# Patient Record
Sex: FEMALE | Race: WHITE | Employment: FULL TIME | URBAN - NONMETROPOLITAN AREA
[De-identification: names, ages, dates, MRNs, and addresses within clinical notes are randomized per-mention and may not be internally consistent; named-entity substitution may affect disease eponyms.]

---

## 2023-08-21 ENCOUNTER — TELEPHONE (OUTPATIENT)
Dept: HEMATOLOGY | Age: 58
End: 2023-08-21

## 2023-08-21 DIAGNOSIS — D58.2 ELEVATED HEMOGLOBIN (HCC): Primary | ICD-10-CM

## 2023-08-21 RX ORDER — CYCLOBENZAPRINE HCL 10 MG
10 TABLET ORAL NIGHTLY PRN
COMMUNITY

## 2023-08-21 RX ORDER — ATORVASTATIN CALCIUM 20 MG/1
20 TABLET, FILM COATED ORAL DAILY
COMMUNITY
End: 2023-08-22

## 2023-08-21 RX ORDER — LISINOPRIL 10 MG/1
10 TABLET ORAL DAILY
COMMUNITY

## 2023-08-21 NOTE — PROGRESS NOTES
OP HEMATOLOGY/ONCOLOGY CONSULTATION      Pt Name: Devonte Mayo  YOB: 1965  MRN: [id number]  Referring provider: Annie Shahid   PCP: Miah Larsen      Date of evaluation: 8/22/2023   History Obtained From:  patient, electronic medical record    CHIEF COMPLAINT:    Chief Complaint   Patient presents with    New Patient     Dx:elevated hgb      HISTORY OF PRESENT ILLNESS:    Devonte Mayo is a 60 y.o.  female referred from Dr. Donato Wheeler for evaluation of elevated hemoglobin. Serology 7/14/2023  CBC- HGB 16.2, HCT 48.3, MCV 92    Martha denied any nicotine dependence, COPD etc.  However she does have sleep apnea and had difficulty tolerating her CPAP therefore she does not use it. She does not have any overt headaches at this time, chest pain, visual disturbances. She reports that for several years her hemoglobin has been elevated at times. She denies any family history of blood dyscrasias. She does have essential hypertension and is on lisinopril 10 daily. She does take fish oil 200 mg daily. She takes vitamin D daily. She does take estrogen progesterone replacement. She does have rheumatoid arthritis was previously treated in [place] now under the care of Dr. Donato Wheeler.         Past Medical History:   Diagnosis Date    Arthritis     Hypertension        Past Surgical History:   Procedure Laterality Date    ENDOMETRIAL ABLATION      uterine ablation in 2011    SINUS SURGERY      interverted papillama removal in 2021    URETHRAL SURGERY      urethra lengthning 1968 and 1984         Current Outpatient Medications:     modafinil (PROVIGIL) 200 MG tablet, , Disp: , Rfl:     hyoscyamine (LEVSIN/SL) 125 MCG sublingual tablet, , Disp: , Rfl:     estrogens, conjugated,-methylTESTOSTERone (ESTRATEST HS) 0.625-1.25 MG per tablet, , Disp: , Rfl:     vitamin D (CHOLECALCIFEROL) 125 MCG (5000 UT) CAPS capsule, Take 1 capsule by mouth, Disp: , Rfl:     Omega-3 Fatty

## 2023-08-22 ENCOUNTER — OFFICE VISIT (OUTPATIENT)
Dept: HEMATOLOGY | Age: 58
End: 2023-08-22
Payer: COMMERCIAL

## 2023-08-22 ENCOUNTER — HOSPITAL ENCOUNTER (OUTPATIENT)
Dept: INFUSION THERAPY | Age: 58
Discharge: HOME | End: 2023-08-22
Payer: COMMERCIAL

## 2023-08-22 VITALS
BODY MASS INDEX: 28.36 KG/M2 | HEART RATE: 76 BPM | WEIGHT: 166.1 LBS | OXYGEN SATURATION: 98 % | DIASTOLIC BLOOD PRESSURE: 80 MMHG | HEIGHT: 64 IN | SYSTOLIC BLOOD PRESSURE: 120 MMHG

## 2023-08-22 DIAGNOSIS — G47.33 OBSTRUCTIVE SLEEP APNEA: ICD-10-CM

## 2023-08-22 DIAGNOSIS — D58.2 ELEVATED HEMOGLOBIN (HCC): ICD-10-CM

## 2023-08-22 DIAGNOSIS — D58.2 ELEVATED HEMOGLOBIN (HCC): Primary | ICD-10-CM

## 2023-08-22 LAB
ERYTHROCYTE [DISTWIDTH] IN BLOOD BY AUTOMATED COUNT: 13 % (ref 11.7–14.4)
FERRITIN SERPL-MCNC: 298.8 NG/ML (ref 13–150)
HCT VFR BLD AUTO: 43.6 % (ref 34.1–44.9)
HGB BLD-MCNC: 15.2 G/DL (ref 11.2–15.7)
IRON SATN MFR SERPL: 33 % (ref 14–50)
IRON SERPL-MCNC: 85 UG/DL (ref 37–145)
LYMPHOCYTES # BLD: 2.32 K/UL (ref 1.18–3.74)
LYMPHOCYTES NFR BLD: 43.8 % (ref 19.3–53.1)
MCH RBC QN AUTO: 30.7 PG (ref 25.6–32.2)
MCHC RBC AUTO-ENTMCNC: 34.9 G/DL (ref 32.3–35.5)
MCV RBC AUTO: 88.1 FL (ref 79.4–94.8)
MONOCYTES # BLD: 0.63 K/UL (ref 0.24–0.82)
MONOCYTES NFR BLD: 11.9 % (ref 4.7–12.5)
NEUTROPHILS # BLD: 2.14 K/UL (ref 1.56–6.13)
NEUTS SEG NFR BLD: 40.4 % (ref 34–71.1)
PLATELET # BLD AUTO: 161 K/UL (ref 182–369)
PMV BLD AUTO: 10.7 FL (ref 7.4–10.4)
RBC # BLD AUTO: 4.95 M/UL (ref 3.93–5.22)
TIBC SERPL-MCNC: 261 UG/DL (ref 250–400)
WBC # BLD AUTO: 5.3 K/UL (ref 3.98–10.04)

## 2023-08-22 PROCEDURE — 99203 OFFICE O/P NEW LOW 30 MIN: CPT | Performed by: PHYSICIAN ASSISTANT

## 2023-08-22 PROCEDURE — 85025 COMPLETE CBC W/AUTO DIFF WBC: CPT

## 2023-08-22 PROCEDURE — 99202 OFFICE O/P NEW SF 15 MIN: CPT

## 2023-08-22 PROCEDURE — 36415 COLL VENOUS BLD VENIPUNCTURE: CPT

## 2023-08-22 RX ORDER — MAGNESIUM OXIDE/MAG AA CHELATE 300 MG
CAPSULE ORAL
COMMUNITY

## 2023-08-22 RX ORDER — ESTERIFIED ESTROGEN AND METHYLTESTOSTERONE .625; 1.25 MG/1; MG/1
TABLET ORAL
COMMUNITY

## 2023-08-22 RX ORDER — MODAFINIL 200 MG/1
TABLET ORAL
COMMUNITY
Start: 2023-08-11

## 2023-08-22 RX ORDER — CHLORAL HYDRATE 500 MG
200 CAPSULE ORAL DAILY
COMMUNITY

## 2023-08-22 ASSESSMENT — ENCOUNTER SYMPTOMS
BLOOD IN STOOL: 0
NAUSEA: 0
VOICE CHANGE: 1
PHOTOPHOBIA: 0
SHORTNESS OF BREATH: 1
COLOR CHANGE: 0
ABDOMINAL DISTENTION: 0
EYE DISCHARGE: 0
COUGH: 0
SORE THROAT: 0
ABDOMINAL PAIN: 1
WHEEZING: 0
TROUBLE SWALLOWING: 0
DIARRHEA: 0
BACK PAIN: 1
EYE ITCHING: 0
VOMITING: 0
CONSTIPATION: 0

## 2023-08-22 ASSESSMENT — PROMIS GLOBAL HEALTH SCALE
IN GENERAL, HOW WOULD YOU RATE YOUR MENTAL HEALTH, INCLUDING YOUR MOOD AND YOUR ABILITY TO THINK [ON A SCALE OF 1 (POOR) TO 5 (EXCELLENT)]?: 2
IN GENERAL, WOULD YOU SAY YOUR QUALITY OF LIFE IS...[ON A SCALE OF 1 (POOR) TO 5 (EXCELLENT)]: 2
SUM OF RESPONSES TO QUESTIONS 2, 4, 5, & 10: 10
IN THE PAST 7 DAYS, HOW WOULD YOU RATE YOUR PAIN ON AVERAGE [ON A SCALE FROM 0 (NO PAIN) TO 10 (WORST IMAGINABLE PAIN)]?: 8
SUM OF RESPONSES TO QUESTIONS 3, 6, 7, & 8: 14
IN THE PAST 7 DAYS, HOW OFTEN HAVE YOU BEEN BOTHERED BY EMOTIONAL PROBLEMS, SUCH AS FEELING ANXIOUS, DEPRESSED, OR IRRITABLE [ON A SCALE FROM 1 (NEVER) TO 5 (ALWAYS)]?: 1
IN GENERAL, PLEASE RATE HOW WELL YOU CARRY OUT YOUR USUAL SOCIAL ACTIVITIES (INCLUDES ACTIVITIES AT HOME, AT WORK, AND IN YOUR COMMUNITY, AND RESPONSIBILITIES AS A PARENT, CHILD, SPOUSE, EMPLOYEE, FRIEND, ETC) [ON A SCALE OF 1 (POOR) TO 5 (EXCELLENT)]?: 5
IN GENERAL, WOULD YOU SAY YOUR HEALTH IS...[ON A SCALE OF 1 (POOR) TO 5 (EXCELLENT)]: 3
IN GENERAL, HOW WOULD YOU RATE YOUR PHYSICAL HEALTH [ON A SCALE OF 1 (POOR) TO 5 (EXCELLENT)]?: 2
IN GENERAL, HOW WOULD YOU RATE YOUR SATISFACTION WITH YOUR SOCIAL ACTIVITIES AND RELATIONSHIPS [ON A SCALE OF 1 (POOR) TO 5 (EXCELLENT)]?: 5
IN THE PAST 7 DAYS, HOW WOULD YOU RATE YOUR FATIGUE ON AVERAGE [ON A SCALE FROM 1 (NONE) TO 5 (VERY SEVERE)]?: 1
TO WHAT EXTENT ARE YOU ABLE TO CARRY OUT YOUR EVERYDAY PHYSICAL ACTIVITIES SUCH AS WALKING, CLIMBING STAIRS, CARRYING GROCERIES, OR MOVING A CHAIR [ON A SCALE OF 1 (NOT AT ALL) TO 5 (COMPLETELY)]?: 3

## 2023-08-24 ENCOUNTER — TELEPHONE (OUTPATIENT)
Dept: HEMATOLOGY | Age: 58
End: 2023-08-24

## 2023-09-13 ENCOUNTER — TELEPHONE (OUTPATIENT)
Dept: HEMATOLOGY | Age: 58
End: 2023-09-13

## 2023-09-13 NOTE — TELEPHONE ENCOUNTER
JM Polanco called patient to follow up from [facility] screening. Patient did not answer this call. SW left message encouraging the patient to return this call at their conveneince.

## 2023-11-27 ENCOUNTER — TELEPHONE (OUTPATIENT)
Dept: HEMATOLOGY | Age: 58
End: 2023-11-27

## 2023-11-27 NOTE — TELEPHONE ENCOUNTER
Called patient and reminded patient of their appointment on 11/28/23 and patient confirmed they would be here.

## 2023-11-28 ENCOUNTER — HOSPITAL ENCOUNTER (OUTPATIENT)
Dept: INFUSION THERAPY | Age: 58
Discharge: HOME | End: 2023-11-28
Payer: COMMERCIAL

## 2023-11-28 ENCOUNTER — OFFICE VISIT (OUTPATIENT)
Dept: HEMATOLOGY | Age: 58
End: 2023-11-28
Payer: COMMERCIAL

## 2023-11-28 VITALS
BODY MASS INDEX: 27.79 KG/M2 | DIASTOLIC BLOOD PRESSURE: 72 MMHG | WEIGHT: 162.8 LBS | SYSTOLIC BLOOD PRESSURE: 122 MMHG | HEIGHT: 64 IN | OXYGEN SATURATION: 98 % | HEART RATE: 65 BPM

## 2023-11-28 DIAGNOSIS — D58.2 ELEVATED HEMOGLOBIN (HCC): Primary | ICD-10-CM

## 2023-11-28 DIAGNOSIS — R79.89 ELEVATED FERRITIN: ICD-10-CM

## 2023-11-28 DIAGNOSIS — D58.2 ELEVATED HEMOGLOBIN (HCC): ICD-10-CM

## 2023-11-28 DIAGNOSIS — G47.33 OBSTRUCTIVE SLEEP APNEA: ICD-10-CM

## 2023-11-28 LAB
BASOPHILS # BLD: 0.06 K/UL (ref 0.01–0.08)
BASOPHILS NFR BLD: 0.8 % (ref 0.1–1.2)
EOSINOPHIL # BLD: 0.09 K/UL (ref 0.04–0.54)
EOSINOPHIL NFR BLD: 1.1 % (ref 0.7–7)
ERYTHROCYTE [DISTWIDTH] IN BLOOD BY AUTOMATED COUNT: 12 % (ref 11.7–14.4)
FERRITIN SERPL-MCNC: 160.1 NG/ML (ref 13–150)
HCT VFR BLD AUTO: 46.7 % (ref 34.1–44.9)
HGB BLD-MCNC: 15.9 G/DL (ref 11.2–15.7)
LYMPHOCYTES # BLD: 3.05 K/UL (ref 1.18–3.74)
LYMPHOCYTES NFR BLD: 38.1 % (ref 19.3–53.1)
MCH RBC QN AUTO: 30.3 PG (ref 25.6–32.2)
MCHC RBC AUTO-ENTMCNC: 34 G/DL (ref 32.3–35.5)
MCV RBC AUTO: 89.1 FL (ref 79.4–94.8)
MONOCYTES # BLD: 0.54 K/UL (ref 0.24–0.82)
MONOCYTES NFR BLD: 6.8 % (ref 4.7–12.5)
NEUTROPHILS # BLD: 4.24 K/UL (ref 1.56–6.13)
NEUTS SEG NFR BLD: 52.9 % (ref 34–71.1)
PLATELET # BLD AUTO: 271 K/UL (ref 182–369)
PMV BLD AUTO: 10.1 FL (ref 7.4–10.4)
RBC # BLD AUTO: 5.24 M/UL (ref 3.93–5.22)
WBC # BLD AUTO: 8 K/UL (ref 3.98–10.04)

## 2023-11-28 PROCEDURE — 36415 COLL VENOUS BLD VENIPUNCTURE: CPT

## 2023-11-28 PROCEDURE — 99212 OFFICE O/P EST SF 10 MIN: CPT

## 2023-11-28 PROCEDURE — 85025 COMPLETE CBC W/AUTO DIFF WBC: CPT

## 2023-11-28 PROCEDURE — 99213 OFFICE O/P EST LOW 20 MIN: CPT | Performed by: PHYSICIAN ASSISTANT

## 2023-11-28 ASSESSMENT — ENCOUNTER SYMPTOMS
SORE THROAT: 0
VOMITING: 0
TROUBLE SWALLOWING: 0
ABDOMINAL PAIN: 0
EYE ITCHING: 0
BACK PAIN: 1
ABDOMINAL DISTENTION: 0
PHOTOPHOBIA: 0
COLOR CHANGE: 0
COUGH: 0
NAUSEA: 0
BLOOD IN STOOL: 0
WHEEZING: 0
DIARRHEA: 0
SHORTNESS OF BREATH: 1
EYE DISCHARGE: 0
VOICE CHANGE: 1
CONSTIPATION: 0

## 2023-11-28 NOTE — PROGRESS NOTES
Progress Note      Pt Name: Artur Preston  YOB: 1965  MRN: [id number]    Date of evaluation: 11/28/2023  History Obtained From:  patient, electronic medical record    CHIEF COMPLAINT:    Chief Complaint   Patient presents with    Follow-up     Elevated hemoglobin      Current active problems  Erythrocytosis  Elevated ferritin  Sleep apnea    HISTORY OF PRESENT ILLNESS:    Artur Preston is a 60 y.o.  female who was initially seen in the office on 8/22/2023 for mild erythrocytosis. She does have significant sleep apnea but has not been able to tolerate her CPAP. Her machine is actually in [place]-she is going through a divorce. I did have iron panel with ferritin obtained on her initial visit, erythropoietin level. She continues to have moderately severe fatigue as well as arthralgias. She previously carried a diagnosis of rheumatoid arthritis but Dr. Hector Pacheco has evaluated her and does not feel that she has rheumatoid arthritis or lupus. She does have essential hypertension and is on lisinopril 10 daily. She does take fish oil 200 mg daily. She takes vitamin D daily. She takes hormone replacement that does have estrogen and methyltestosterone. HEMATOLOGY HISTORY:  Audrey Goodell was seen in initial hematology consultation on 8/22/2023 referred from Dr. Hector Pacheco for evaluation of elevated hemoglobin. Serology 7/14/2023  CBC- HGB 16.2, HCT 48.3, MCV 92     Martha denied any nicotine dependence, COPD etc.  However she does have sleep apnea and had difficulty tolerating her CPAP therefore she does not use it. She does not have any overt headaches at this time, chest pain, visual disturbances. She reports that for several years her hemoglobin has been elevated at times. She denies any family history of blood dyscrasias.      Her initial CBC in the office on 8/22/2023 revealed a WBC of 5.3 with 40.4% PMN, 43.8% lymphocyte, 11.9% monocyte, Hgb 15.2, HCT 43.6, PLT

## 2024-01-26 ENCOUNTER — TELEPHONE (OUTPATIENT)
Dept: HEMATOLOGY | Age: 59
End: 2024-01-26

## 2024-01-26 NOTE — TELEPHONE ENCOUNTER
Called patient and reminded patient of their appointment on 1/30/2024 and patient confirmed they would be here.

## 2024-01-29 NOTE — PROGRESS NOTES
Progress Note      Pt Name: Martha Garcia  YOB: 1965  MRN: [id number]    Date of evaluation: 1/30/2024  History Obtained From:  patient, electronic medical record    CHIEF COMPLAINT:    Chief Complaint   Patient presents with    Follow-up     Elevated hemoglobin (HCC)         Current active problems  Erythrocytosis  Elevated ferritin  Sleep apnea    HISTORY OF PRESENT ILLNESS:    Martha Garcia is a 58 y.o.  female who was initially seen in the office on 8/22/2023 for mild erythrocytosis.    She does have significant sleep apnea but has not been able to tolerate her CPAP.  Her machine is actually in [place]-she is going through a divorce.    She previously carried a diagnosis of rheumatoid arthritis but Dr. Aime Carmichael has evaluated her and does not feel that she has rheumatoid arthritis or lupus.  She has been doing intermittent fasting 18-6 and she reports that the inflammation that she felt in her joints is much better.  Energy wise she has been doing much better as well.    Prior ferritin was elevated but it repeat last visit and showed improvement there is minimal elevation.  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      She does have essential hypertension and is on lisinopril 10 daily.  She does take fish oil 200 mg daily.  She takes vitamin D daily.  She takes hormone replacement that does have estrogen and methyltestosterone.    HEMATOLOGY HISTORY: Mild erythrocytosis  Martha was seen in initial hematology consultation on 8/22/2023 referred from Dr. Aime Carmichael for evaluation of elevated hemoglobin.      Serology 7/14/2023  CBC- HGB 16.2, HCT 48.3, MCV 92     Martha denied any nicotine dependence, COPD etc.  However she does have sleep apnea and had difficulty tolerating her CPAP therefore she does not use it.     She does not have any overt headaches at

## 2024-01-30 ENCOUNTER — OFFICE VISIT (OUTPATIENT)
Dept: HEMATOLOGY | Age: 59
End: 2024-01-30
Payer: COMMERCIAL

## 2024-01-30 ENCOUNTER — HOSPITAL ENCOUNTER (OUTPATIENT)
Dept: INFUSION THERAPY | Age: 59
Discharge: HOME | End: 2024-01-30
Payer: COMMERCIAL

## 2024-01-30 VITALS
TEMPERATURE: 98 F | OXYGEN SATURATION: 98 % | HEART RATE: 77 BPM | BODY MASS INDEX: 27.39 KG/M2 | HEIGHT: 64 IN | SYSTOLIC BLOOD PRESSURE: 130 MMHG | WEIGHT: 160.4 LBS | DIASTOLIC BLOOD PRESSURE: 72 MMHG

## 2024-01-30 DIAGNOSIS — R79.89 ELEVATED FERRITIN: ICD-10-CM

## 2024-01-30 DIAGNOSIS — G47.33 OBSTRUCTIVE SLEEP APNEA: ICD-10-CM

## 2024-01-30 DIAGNOSIS — D58.2 ELEVATED HEMOGLOBIN (HCC): ICD-10-CM

## 2024-01-30 DIAGNOSIS — Z87.891 PERSONAL HISTORY OF TOBACCO USE: ICD-10-CM

## 2024-01-30 DIAGNOSIS — D58.2 ELEVATED HEMOGLOBIN (HCC): Primary | ICD-10-CM

## 2024-01-30 DIAGNOSIS — Z87.891 HISTORY OF NICOTINE DEPENDENCE: ICD-10-CM

## 2024-01-30 DIAGNOSIS — Z53.20 LUNG CANCER SCREENING DECLINED BY PATIENT: ICD-10-CM

## 2024-01-30 LAB
BASOPHILS # BLD: 0.09 K/UL (ref 0.01–0.08)
BASOPHILS NFR BLD: 1 % (ref 0.1–1.2)
EOSINOPHIL # BLD: 0.09 K/UL (ref 0.04–0.54)
EOSINOPHIL NFR BLD: 1 % (ref 0.7–7)
ERYTHROCYTE [DISTWIDTH] IN BLOOD BY AUTOMATED COUNT: 12.4 % (ref 11.7–14.4)
FERRITIN SERPL-MCNC: 130.5 NG/ML (ref 13–150)
HCT VFR BLD AUTO: 46.3 % (ref 34.1–44.9)
HGB BLD-MCNC: 15.7 G/DL (ref 11.2–15.7)
IRON SATN MFR SERPL: 29 % (ref 14–50)
IRON SERPL-MCNC: 90 UG/DL (ref 37–145)
LYMPHOCYTES # BLD: 2.92 K/UL (ref 1.18–3.74)
LYMPHOCYTES NFR BLD: 33 % (ref 19.3–53.1)
MCH RBC QN AUTO: 30.5 PG (ref 25.6–32.2)
MCHC RBC AUTO-ENTMCNC: 33.9 G/DL (ref 32.3–35.5)
MCV RBC AUTO: 90.1 FL (ref 79.4–94.8)
MONOCYTES # BLD: 0.68 K/UL (ref 0.24–0.82)
MONOCYTES NFR BLD: 7.7 % (ref 4.7–12.5)
NEUTROPHILS # BLD: 5.06 K/UL (ref 1.56–6.13)
NEUTS SEG NFR BLD: 57.1 % (ref 34–71.1)
PLATELET # BLD AUTO: 286 K/UL (ref 182–369)
PMV BLD AUTO: 10.2 FL (ref 7.4–10.4)
RBC # BLD AUTO: 5.14 M/UL (ref 3.93–5.22)
TIBC SERPL-MCNC: 315 UG/DL (ref 250–400)
WBC # BLD AUTO: 8.86 K/UL (ref 3.98–10.04)

## 2024-01-30 PROCEDURE — 99212 OFFICE O/P EST SF 10 MIN: CPT

## 2024-01-30 PROCEDURE — 99213 OFFICE O/P EST LOW 20 MIN: CPT | Performed by: PHYSICIAN ASSISTANT

## 2024-01-30 PROCEDURE — 85025 COMPLETE CBC W/AUTO DIFF WBC: CPT

## 2024-01-30 PROCEDURE — 36415 COLL VENOUS BLD VENIPUNCTURE: CPT

## 2024-01-30 PROCEDURE — G0296 VISIT TO DETERM LDCT ELIG: HCPCS | Performed by: PHYSICIAN ASSISTANT

## 2024-01-30 ASSESSMENT — ENCOUNTER SYMPTOMS
TROUBLE SWALLOWING: 0
ABDOMINAL PAIN: 0
NAUSEA: 0
DIARRHEA: 0
BLOOD IN STOOL: 0
PHOTOPHOBIA: 0
VOICE CHANGE: 1
SORE THROAT: 0
COLOR CHANGE: 0
EYE DISCHARGE: 0
EYE ITCHING: 0
SHORTNESS OF BREATH: 0
VOMITING: 0
BACK PAIN: 1
ABDOMINAL DISTENTION: 0
COUGH: 0
CONSTIPATION: 0
WHEEZING: 0

## 2024-01-31 ENCOUNTER — TELEPHONE (OUTPATIENT)
Dept: INFUSION THERAPY | Age: 59
End: 2024-01-31

## 2024-01-31 NOTE — TELEPHONE ENCOUNTER
----- Message from John Escobar PA-C sent at 1/30/2024  4:32 PM CST -----  Let her know that her ferritin and iron levels were actually completely normal this time

## 2024-02-07 LAB
CALR EXON 9 MUT ANL BLD/T: NORMAL
CITATION REF LAB TEST: NORMAL
JAK2 GENE MUT ANL BLD/T: NORMAL
JAK2 P.V617F BLD/T QL: NORMAL
LAB DIRECTOR NAME PROVIDER: NORMAL
MPL GENE MUT TESTED MAR: NORMAL
REF LAB TEST METHOD: NORMAL
REFLEX: NORMAL
TEST PERFORMANCE INFO SPEC: NORMAL

## 2024-04-25 ENCOUNTER — TELEPHONE (OUTPATIENT)
Dept: HEMATOLOGY | Age: 59
End: 2024-04-25

## 2024-04-29 NOTE — PROGRESS NOTES
Progress Note      Pt Name: Martha Garcia  YOB: 1965  MRN: [id number]    Date of evaluation: 4/30/2024  History Obtained From:  patient, electronic medical record    CHIEF COMPLAINT:    Chief Complaint   Patient presents with    Follow-up     Elevated hemoglobin     Current active problems  Erythrocytosis  Elevated ferritin  Sleep apnea    HISTORY OF PRESENT ILLNESS:    Martha Garcia is a 58 y.o.  female who was initially seen in the office on 8/22/2023 for mild erythrocytosis.    She does have significant sleep apnea but has not been able to tolerate her CPAP.  Her machine is actually in [place]-she is going through a divorce.  She continues to have significant anxiety.    She previously carried a diagnosis of rheumatoid arthritis but Dr. Aime Carmichael has evaluated her and does not feel that she has rheumatoid arthritis or lupus.  She felt much better when she was in January.  She feels rundown today.  She is still having issues with swelling of her hands and feet.    She previously had elevated ferritin but when rechecked in January it was completely normal at 130.5.  She has not had any significant elevation of her iron saturation.    She does have essential hypertension and is on lisinopril 10 daily.  She does take fish oil 200 mg daily.  She takes vitamin D daily.  She takes hormone replacement that does have estrogen and methyltestosterone.    HEMATOLOGY HISTORY: Mild erythrocytosis  Martha was seen in initial hematology consultation on 8/22/2023 referred from Dr. Aime Carmichael for evaluation of elevated hemoglobin.      Serology 7/14/2023  CBC- HGB 16.2, HCT 48.3, MCV 92     Martha denied any nicotine dependence, COPD etc.  However she does have sleep apnea and had difficulty tolerating her CPAP therefore she does not use it.     She does not have any overt headaches at this time, chest pain, visual disturbances.  She reports that for several years her hemoglobin has been

## 2024-04-30 ENCOUNTER — HOSPITAL ENCOUNTER (OUTPATIENT)
Dept: INFUSION THERAPY | Age: 59
Discharge: HOME | End: 2024-04-30
Payer: COMMERCIAL

## 2024-04-30 ENCOUNTER — OFFICE VISIT (OUTPATIENT)
Dept: HEMATOLOGY | Age: 59
End: 2024-04-30
Payer: COMMERCIAL

## 2024-04-30 VITALS
WEIGHT: 160 LBS | TEMPERATURE: 98.2 F | SYSTOLIC BLOOD PRESSURE: 116 MMHG | HEART RATE: 72 BPM | HEIGHT: 64 IN | DIASTOLIC BLOOD PRESSURE: 80 MMHG | BODY MASS INDEX: 27.31 KG/M2 | OXYGEN SATURATION: 93 %

## 2024-04-30 DIAGNOSIS — G47.33 OBSTRUCTIVE SLEEP APNEA: ICD-10-CM

## 2024-04-30 DIAGNOSIS — Z87.891 HISTORY OF NICOTINE DEPENDENCE: ICD-10-CM

## 2024-04-30 DIAGNOSIS — D58.2 ELEVATED HEMOGLOBIN (HCC): Primary | ICD-10-CM

## 2024-04-30 DIAGNOSIS — R79.89 ELEVATED FERRITIN: ICD-10-CM

## 2024-04-30 DIAGNOSIS — D58.2 ELEVATED HEMOGLOBIN (HCC): ICD-10-CM

## 2024-04-30 LAB
BASOPHILS # BLD: 0.08 K/UL (ref 0.01–0.08)
BASOPHILS NFR BLD: 1.2 % (ref 0.1–1.2)
EOSINOPHIL # BLD: 0.09 K/UL (ref 0.04–0.54)
EOSINOPHIL NFR BLD: 1.4 % (ref 0.7–7)
ERYTHROCYTE [DISTWIDTH] IN BLOOD BY AUTOMATED COUNT: 12.1 % (ref 11.7–14.4)
FERRITIN SERPL-MCNC: 152.9 NG/ML (ref 13–150)
HCT VFR BLD AUTO: 45.2 % (ref 34.1–44.9)
HGB BLD-MCNC: 15.6 G/DL (ref 11.2–15.7)
IRON SATN MFR SERPL: 41 % (ref 14–50)
IRON SERPL-MCNC: 119 UG/DL (ref 37–145)
LYMPHOCYTES # BLD: 2.56 K/UL (ref 1.18–3.74)
LYMPHOCYTES NFR BLD: 39.9 % (ref 19.3–53.1)
MCH RBC QN AUTO: 30.8 PG (ref 25.6–32.2)
MCHC RBC AUTO-ENTMCNC: 34.5 G/DL (ref 32.3–35.5)
MCV RBC AUTO: 89.2 FL (ref 79.4–94.8)
MONOCYTES # BLD: 0.5 K/UL (ref 0.24–0.82)
MONOCYTES NFR BLD: 7.8 % (ref 4.7–12.5)
NEUTROPHILS # BLD: 3.17 K/UL (ref 1.56–6.13)
NEUTS SEG NFR BLD: 49.5 % (ref 34–71.1)
PLATELET # BLD AUTO: 234 K/UL (ref 182–369)
PMV BLD AUTO: 10 FL (ref 7.4–10.4)
RBC # BLD AUTO: 5.07 M/UL (ref 3.93–5.22)
TIBC SERPL-MCNC: 288 UG/DL (ref 250–400)
WBC # BLD AUTO: 6.41 K/UL (ref 3.98–10.04)

## 2024-04-30 PROCEDURE — 36415 COLL VENOUS BLD VENIPUNCTURE: CPT

## 2024-04-30 PROCEDURE — 99212 OFFICE O/P EST SF 10 MIN: CPT

## 2024-04-30 PROCEDURE — 85025 COMPLETE CBC W/AUTO DIFF WBC: CPT

## 2024-04-30 PROCEDURE — 99213 OFFICE O/P EST LOW 20 MIN: CPT | Performed by: PHYSICIAN ASSISTANT

## 2024-04-30 ASSESSMENT — ENCOUNTER SYMPTOMS
SHORTNESS OF BREATH: 0
SORE THROAT: 0
VOICE CHANGE: 1
COUGH: 0
VOMITING: 0
NAUSEA: 0
EYE DISCHARGE: 0
PHOTOPHOBIA: 0
ABDOMINAL DISTENTION: 0
TROUBLE SWALLOWING: 0
CONSTIPATION: 0
BACK PAIN: 1
BLOOD IN STOOL: 0
ABDOMINAL PAIN: 0
WHEEZING: 0
COLOR CHANGE: 0
EYE ITCHING: 0
DIARRHEA: 0

## 2024-05-08 LAB
HFE GENE MUT ANL BLD/T: NORMAL
HFE P.C282Y BLD/T QL: NORMAL
HFE P.H63D BLD/T QL: NEGATIVE
HFE P.S65C BLD/T QL: NORMAL
SPECIMEN SOURCE: NORMAL

## 2024-05-09 ENCOUNTER — TELEPHONE (OUTPATIENT)
Dept: HEMATOLOGY | Age: 59
End: 2024-05-09

## 2024-05-09 DIAGNOSIS — E83.118 OTHER HEMOCHROMATOSIS: Primary | ICD-10-CM

## 2024-05-09 RX ORDER — 0.9 % SODIUM CHLORIDE 0.9 %
500 INTRAVENOUS SOLUTION INTRAVENOUS ONCE
OUTPATIENT
Start: 2024-05-09 | End: 2024-05-09

## 2024-05-09 NOTE — TELEPHONE ENCOUNTER
----- Message from John Escobar PA-C sent at 5/8/2024  3:16 PM CDT -----  Let her know that her hemochromatosis panel was actually positive.  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  Since she has a single copy of 2 different genes she needs to be treated.  We should start therapeutic phlebotomy see if there is a way she can have these done locally or if she wants to come to the office.  If she wants to come to [place] then see if she can have come someone come with her for her first therapeutic phlebotomy-500 cc

## 2024-05-13 ENCOUNTER — CLINICAL DOCUMENTATION (OUTPATIENT)
Facility: HOSPITAL | Age: 59
End: 2024-05-13

## 2024-05-13 ENCOUNTER — HOSPITAL ENCOUNTER (OUTPATIENT)
Dept: INFUSION THERAPY | Age: 59
Setting detail: INFUSION SERIES
Discharge: HOME | End: 2024-05-13
Payer: COMMERCIAL

## 2024-05-13 VITALS
TEMPERATURE: 97.6 F | HEART RATE: 75 BPM | RESPIRATION RATE: 18 BRPM | DIASTOLIC BLOOD PRESSURE: 71 MMHG | SYSTOLIC BLOOD PRESSURE: 116 MMHG | OXYGEN SATURATION: 99 %

## 2024-05-13 DIAGNOSIS — E83.118 OTHER HEMOCHROMATOSIS: Primary | ICD-10-CM

## 2024-05-13 LAB
BASOPHILS # BLD: 0.1 K/UL (ref 0–0.2)
BASOPHILS NFR BLD: 1.5 % (ref 0–1)
EOSINOPHIL # BLD: 0.1 K/UL (ref 0–0.6)
EOSINOPHIL NFR BLD: 1.9 % (ref 0–5)
ERYTHROCYTE [DISTWIDTH] IN BLOOD BY AUTOMATED COUNT: 12.3 % (ref 11.5–14.5)
FERRITIN SERPL-MCNC: 118.1 NG/ML (ref 13–150)
HCT VFR BLD AUTO: 46.5 % (ref 37–47)
HGB BLD-MCNC: 15.4 G/DL (ref 12–16)
IMM GRANULOCYTES # BLD: 0 K/UL
LYMPHOCYTES # BLD: 2.7 K/UL (ref 1.1–4.5)
LYMPHOCYTES NFR BLD: 43.5 % (ref 20–40)
MCH RBC QN AUTO: 30.1 PG (ref 27–31)
MCHC RBC AUTO-ENTMCNC: 33.1 G/DL (ref 33–37)
MCV RBC AUTO: 90.8 FL (ref 81–99)
MONOCYTES # BLD: 0.5 K/UL (ref 0–0.9)
MONOCYTES NFR BLD: 7.8 % (ref 0–10)
NEUTROPHILS # BLD: 2.8 K/UL (ref 1.5–7.5)
NEUTS SEG NFR BLD: 45 % (ref 50–65)
PLATELET # BLD AUTO: 260 K/UL (ref 130–400)
PMV BLD AUTO: 10.5 FL (ref 9.4–12.3)
RBC # BLD AUTO: 5.12 M/UL (ref 4.2–5.4)
WBC # BLD AUTO: 6.2 K/UL (ref 4.8–10.8)

## 2024-05-13 PROCEDURE — 99195 PHLEBOTOMY: CPT

## 2024-05-13 PROCEDURE — 82728 ASSAY OF FERRITIN: CPT

## 2024-05-13 PROCEDURE — 85025 COMPLETE CBC W/AUTO DIFF WBC: CPT

## 2024-05-13 RX ORDER — 0.9 % SODIUM CHLORIDE 0.9 %
500 INTRAVENOUS SOLUTION INTRAVENOUS ONCE
OUTPATIENT
Start: 2024-05-19 | End: 2024-05-19

## 2024-05-13 NOTE — PROGRESS NOTES
Pt arrived to [facility] for therapeutic phlebotomy. 533ml whole blood removed. Pt tolerated well.    Electronically signed by Leticia Cueto RN on 5/13/2024 at 11:52 AM

## 2024-06-13 ENCOUNTER — HOSPITAL ENCOUNTER (OUTPATIENT)
Dept: INFUSION THERAPY | Age: 59
Setting detail: INFUSION SERIES
Discharge: HOME | End: 2024-06-13
Payer: COMMERCIAL

## 2024-06-13 VITALS
SYSTOLIC BLOOD PRESSURE: 122 MMHG | HEART RATE: 64 BPM | RESPIRATION RATE: 18 BRPM | TEMPERATURE: 97.4 F | OXYGEN SATURATION: 96 % | DIASTOLIC BLOOD PRESSURE: 65 MMHG

## 2024-06-13 DIAGNOSIS — E83.118 OTHER HEMOCHROMATOSIS: ICD-10-CM

## 2024-06-13 LAB
BASOPHILS # BLD: 0.1 K/UL (ref 0–0.2)
BASOPHILS NFR BLD: 1.5 % (ref 0–1)
EOSINOPHIL # BLD: 0.1 K/UL (ref 0–0.6)
EOSINOPHIL NFR BLD: 1.4 % (ref 0–5)
ERYTHROCYTE [DISTWIDTH] IN BLOOD BY AUTOMATED COUNT: 12 % (ref 11.5–14.5)
FERRITIN SERPL-MCNC: 89.3 NG/ML (ref 13–150)
HCT VFR BLD AUTO: 46.8 % (ref 37–47)
HGB BLD-MCNC: 15.7 G/DL (ref 12–16)
IMM GRANULOCYTES # BLD: 0 K/UL
LYMPHOCYTES # BLD: 3 K/UL (ref 1.1–4.5)
LYMPHOCYTES NFR BLD: 45 % (ref 20–40)
MCH RBC QN AUTO: 30.5 PG (ref 27–31)
MCHC RBC AUTO-ENTMCNC: 33.5 G/DL (ref 33–37)
MCV RBC AUTO: 90.9 FL (ref 81–99)
MONOCYTES # BLD: 0.5 K/UL (ref 0–0.9)
MONOCYTES NFR BLD: 8.2 % (ref 0–10)
NEUTROPHILS # BLD: 2.9 K/UL (ref 1.5–7.5)
NEUTS SEG NFR BLD: 43.7 % (ref 50–65)
PLATELET # BLD AUTO: 272 K/UL (ref 130–400)
PMV BLD AUTO: 10.1 FL (ref 9.4–12.3)
RBC # BLD AUTO: 5.15 M/UL (ref 4.2–5.4)
WBC # BLD AUTO: 6.6 K/UL (ref 4.8–10.8)

## 2024-06-13 PROCEDURE — 82728 ASSAY OF FERRITIN: CPT

## 2024-06-13 PROCEDURE — 85025 COMPLETE CBC W/AUTO DIFF WBC: CPT

## 2024-06-13 PROCEDURE — 99195 PHLEBOTOMY: CPT

## 2024-06-13 NOTE — DISCHARGE INSTRUCTIONS
Complete Blood Count (CBC): About This Test  What is it?     A complete blood count (CBC) is a blood test that gives important information about your blood cells, especially red blood cells, white blood cells, and platelets.  Why is this test done?  A CBC may be done as part of a regular physical exam. There are many other reasons that a doctor may want this blood test, including to:  Find the cause of symptoms such as fatigue, weakness, fever, bruising, or weight loss.  Check for anemia.  See how much blood has been lost if there is bleeding.  Diagnose polycythemia.  Check for an infection.  Diagnose diseases of the blood, such as leukemia.  Check how the body is dealing with some types of drug or radiation treatment.  Check how abnormal bleeding is affecting the blood cells and counts.  Screen for high and low values before a surgery.  See if there are too many or too few of certain types of cells. This may help find other conditions. For instance, too many eosinophils may be a sign of an allergy or asthma.  A blood count can give valuable information about the general state of your health.  How do you prepare for the test?  In general, there's nothing you have to do before this test, unless your doctor tells you to.  How is the test done?  A health professional uses a needle to take a blood sample, usually from the arm.  What happens after the test?  You will probably be able to go home right away.  You can go back to your usual activities right away.  Follow-up care is a key part of your treatment and safety. Be sure to make and go to all appointments, and call your doctor if you are having problems. It's also a good idea to keep a list of the medicines you take. Ask your doctor when you can expect to have your test results.  Where can you learn more?  Go to https://www.healthwise.net/patientEd and enter Q692 to learn more about \"Complete Blood Count (CBC): About This Test.\"  Current as of: May 13,

## 2024-06-13 NOTE — PROGRESS NOTES
Therapeutic phlebotomy completed. STAT cbc and ferritin obtained at start of phlebotomy. Hbg 15.7   Pt tolerated well.

## 2024-07-12 ENCOUNTER — HOSPITAL ENCOUNTER (OUTPATIENT)
Dept: INFUSION THERAPY | Age: 59
Setting detail: INFUSION SERIES
Discharge: HOME | End: 2024-07-12
Payer: COMMERCIAL

## 2024-07-12 VITALS
OXYGEN SATURATION: 98 % | HEART RATE: 65 BPM | RESPIRATION RATE: 18 BRPM | SYSTOLIC BLOOD PRESSURE: 110 MMHG | TEMPERATURE: 99 F | DIASTOLIC BLOOD PRESSURE: 72 MMHG

## 2024-07-12 DIAGNOSIS — E83.118 OTHER HEMOCHROMATOSIS: ICD-10-CM

## 2024-07-12 LAB
BASOPHILS # BLD: 0.1 K/UL (ref 0–0.2)
BASOPHILS NFR BLD: 1.6 % (ref 0–1)
EOSINOPHIL # BLD: 0.1 K/UL (ref 0–0.6)
EOSINOPHIL NFR BLD: 1.6 % (ref 0–5)
ERYTHROCYTE [DISTWIDTH] IN BLOOD BY AUTOMATED COUNT: 12.2 % (ref 11.5–14.5)
FERRITIN SERPL-MCNC: 42.8 NG/ML (ref 13–150)
HCT VFR BLD AUTO: 46.6 % (ref 37–47)
HGB BLD-MCNC: 14.8 G/DL (ref 12–16)
IMM GRANULOCYTES # BLD: 0 K/UL
LYMPHOCYTES # BLD: 2.6 K/UL (ref 1.1–4.5)
LYMPHOCYTES NFR BLD: 40.4 % (ref 20–40)
MCH RBC QN AUTO: 29.5 PG (ref 27–31)
MCHC RBC AUTO-ENTMCNC: 31.8 G/DL (ref 33–37)
MCV RBC AUTO: 93 FL (ref 81–99)
MONOCYTES # BLD: 0.6 K/UL (ref 0–0.9)
MONOCYTES NFR BLD: 9.2 % (ref 0–10)
NEUTROPHILS # BLD: 3 K/UL (ref 1.5–7.5)
NEUTS SEG NFR BLD: 46.9 % (ref 50–65)
PLATELET # BLD AUTO: 252 K/UL (ref 130–400)
PMV BLD AUTO: 10.6 FL (ref 9.4–12.3)
RBC # BLD AUTO: 5.01 M/UL (ref 4.2–5.4)
WBC # BLD AUTO: 6.4 K/UL (ref 4.8–10.8)

## 2024-07-12 PROCEDURE — 36415 COLL VENOUS BLD VENIPUNCTURE: CPT

## 2024-07-12 PROCEDURE — 82728 ASSAY OF FERRITIN: CPT

## 2024-07-12 PROCEDURE — 85025 COMPLETE CBC W/AUTO DIFF WBC: CPT

## 2024-07-12 PROCEDURE — 99195 PHLEBOTOMY: CPT

## 2024-07-12 RX ORDER — 0.9 % SODIUM CHLORIDE 0.9 %
500 INTRAVENOUS SOLUTION INTRAVENOUS ONCE
OUTPATIENT
Start: 2024-07-14 | End: 2024-07-14

## 2024-07-12 NOTE — DISCHARGE INSTRUCTIONS
Blood Draw for Donation or Treatment: Care Instructions  Overview     You have just had some blood removed. This procedure is called phlebotomy (say \"igwg-OPE-ttn-marlene\").  People have their blood taken (drawn) for several reasons. You may have just donated blood so that it can be used to help someone else. Or you may have had blood removed to treat a medical condition, such as hemochromatosis or polycythemia. These take more blood than the sample that is needed for simple lab tests. For donation, about a pint of blood is drawn. If it's drawn for treatment, then more or less than a pint may be taken.  The puncture wound caused by the poke from the needle for giving blood usually heals without trouble. Most people feel fine after they give blood. But there are some simple things you can do to take care of yourself before you go home.  Right after you give blood, you may be asked to sit for a while and have some water or juice and a snack.  When you leave, get up slowly to make sure that you're not lightheaded. You may want to have a family member or friend take you home.  Follow-up care is a key part of your treatment and safety. Be sure to make and go to all appointments, and call your doctor if you are having problems. It's also a good idea to know your test results and keep a list of the medicines you take.  How can you care for yourself at home?  In the hours after you give blood, make sure to:  Drink plenty of fluids to help replace the lost fluid. If you have kidney, heart, or liver disease and have to limit fluids, talk with your doctor before you increase the amount of fluids you drink.  Limit your physical activity for several hours.  If you feel a little lightheaded, lie down for a while, and have some snacks. Call the blood bank or clinic if you feel sick within 24 hours after you give blood.  Eat foods rich in iron, such as meat, fish, beans, or leafy green vegetables, for several weeks to help your  body make new red blood cells.  When should you call for help?   Call your doctor now or seek immediate medical care if:    You are dizzy or lightheaded or feel like you may faint.     You have signs of infection, such as:  Increased pain, swelling, warmth, or redness.  Red streaks leading from the area.  Pus draining from the area.  A fever.   Watch closely for changes in your health, and be sure to contact your doctor if you have any problems.  Where can you learn more?  Go to https://www.[employer].net/patientEd and enter O538 to learn more about \"Blood Draw for Donation or Treatment: Care Instructions.\"  Current as of: December 13, 2023  Content Version: 14.1  © 2006-2024 [employer]   Care instructions adapted under license by [employer]. If you have questions about a medical condition or this instruction, always ask your healthcare professional. [employer] disclaims any warranty or liability for your use of this information.

## 2024-07-12 NOTE — PROGRESS NOTES
Stat labs drawn on arrival to [facility].  Hgb resulted at 14.8, therapeutic phlebotomy completed (see flow sheet)  pt tolerated well.

## 2024-08-09 ENCOUNTER — HOSPITAL ENCOUNTER (OUTPATIENT)
Dept: INFUSION THERAPY | Age: 59
Setting detail: INFUSION SERIES
Discharge: HOME | End: 2024-08-09
Payer: COMMERCIAL

## 2024-08-09 VITALS
RESPIRATION RATE: 18 BRPM | DIASTOLIC BLOOD PRESSURE: 72 MMHG | OXYGEN SATURATION: 95 % | HEART RATE: 71 BPM | TEMPERATURE: 97.6 F | SYSTOLIC BLOOD PRESSURE: 118 MMHG

## 2024-08-09 DIAGNOSIS — E83.118 OTHER HEMOCHROMATOSIS: Primary | ICD-10-CM

## 2024-08-09 LAB
BASOPHILS # BLD: 0.1 K/UL (ref 0–0.2)
BASOPHILS NFR BLD: 1.1 % (ref 0–1)
EOSINOPHIL # BLD: 0.1 K/UL (ref 0–0.6)
EOSINOPHIL NFR BLD: 1.6 % (ref 0–5)
ERYTHROCYTE [DISTWIDTH] IN BLOOD BY AUTOMATED COUNT: 11.9 % (ref 11.5–14.5)
FERRITIN SERPL-MCNC: 29.6 NG/ML (ref 13–150)
HCT VFR BLD AUTO: 44.6 % (ref 37–47)
HGB BLD-MCNC: 14.9 G/DL (ref 12–16)
IMM GRANULOCYTES # BLD: 0 K/UL
LYMPHOCYTES # BLD: 4.2 K/UL (ref 1.1–4.5)
LYMPHOCYTES NFR BLD: 52.1 % (ref 20–40)
MCH RBC QN AUTO: 30 PG (ref 27–31)
MCHC RBC AUTO-ENTMCNC: 33.4 G/DL (ref 33–37)
MCV RBC AUTO: 89.9 FL (ref 81–99)
MONOCYTES # BLD: 0.7 K/UL (ref 0–0.9)
MONOCYTES NFR BLD: 8 % (ref 0–10)
NEUTROPHILS # BLD: 3 K/UL (ref 1.5–7.5)
NEUTS SEG NFR BLD: 37 % (ref 50–65)
PLATELET # BLD AUTO: 132 K/UL (ref 130–400)
PLATELET SLIDE REVIEW: ADEQUATE
PMV BLD AUTO: 11.4 FL (ref 9.4–12.3)
RBC # BLD AUTO: 4.96 M/UL (ref 4.2–5.4)
WBC # BLD AUTO: 8.1 K/UL (ref 4.8–10.8)

## 2024-08-09 PROCEDURE — 85025 COMPLETE CBC W/AUTO DIFF WBC: CPT

## 2024-08-09 PROCEDURE — 82728 ASSAY OF FERRITIN: CPT

## 2024-08-09 RX ORDER — 0.9 % SODIUM CHLORIDE 0.9 %
500 INTRAVENOUS SOLUTION INTRAVENOUS ONCE
OUTPATIENT
Start: 2024-08-16 | End: 2024-08-16

## 2024-08-09 NOTE — PROGRESS NOTES
Therapeutic phlebotomy completed, ferritin and CBC obtained. 280 ml of whole blood removed. See flowsheet.

## 2024-08-23 ENCOUNTER — TELEPHONE (OUTPATIENT)
Dept: HEMATOLOGY | Age: 59
End: 2024-08-23

## 2024-08-26 NOTE — PROGRESS NOTES
Progress Note      Pt Name: Martha Garcia  YOB: 1965  MRN: [id number]    Date of evaluation: 8/27/2024  History Obtained From:  patient, electronic medical record    CHIEF COMPLAINT:    Chief Complaint   Patient presents with    Follow-up     Elevated hemoglobin (HCC)     Current active problems  Erythrocytosis  Heterozygous C282Y hereditary hemochromatosis   Sleep apnea    HISTORY OF PRESENT ILLNESS:    Martha Garcia is a 59 y.o.  female who was initially seen in the office on 8/22/2023 for mild erythrocytosis.  She does have sleep apnea but the machine is in [place]-she is getting a divorce and has not been able to get her machine.    She previously had elevated ferritin but when rechecked in January it was completely normal at 130.5.  She has not had any significant elevation of her iron saturation.  Due to persistent fatigue, A hereditary hemochromatosis panel was obtained and she was found to have Heterozygous C282Y hereditary hemochromatosis.  Due to symptoms she has been having therapeutic phlebotomies.  She reports that the warmness of her hands have gone away with the therapy of phlebotomies.  Energy level also significantly improved.  She is starting to have swelling of her hands again today.    She does have essential hypertension and is on lisinopril 10 daily.  She does take fish oil 200 mg daily.  She takes vitamin D daily.  She takes hormone replacement that does have estrogen and methyltestosterone.    1st HEMATOLOGY HISTORY: Mild erythrocytosis  Martha was seen in initial hematology consultation on 8/22/2023 referred from Dr. Aime Carmichael for evaluation of elevated hemoglobin.      Serology 7/14/2023  CBC- HGB 16.2, HCT 48.3, MCV 92     Martha denied any nicotine dependence, COPD etc.  However she does have sleep apnea and had difficulty tolerating her CPAP therefore she does not use it.     She does not have any overt headaches at this time, chest pain, visual disturbances.

## 2024-08-27 ENCOUNTER — HOSPITAL ENCOUNTER (OUTPATIENT)
Dept: INFUSION THERAPY | Age: 59
Discharge: HOME | End: 2024-08-27
Payer: COMMERCIAL

## 2024-08-27 ENCOUNTER — OFFICE VISIT (OUTPATIENT)
Dept: HEMATOLOGY | Age: 59
End: 2024-08-27
Payer: COMMERCIAL

## 2024-08-27 VITALS
TEMPERATURE: 97.5 F | WEIGHT: 164.8 LBS | OXYGEN SATURATION: 98 % | HEIGHT: 64 IN | DIASTOLIC BLOOD PRESSURE: 70 MMHG | BODY MASS INDEX: 28.13 KG/M2 | SYSTOLIC BLOOD PRESSURE: 108 MMHG | HEART RATE: 75 BPM

## 2024-08-27 DIAGNOSIS — E83.118 OTHER HEMOCHROMATOSIS: Primary | ICD-10-CM

## 2024-08-27 DIAGNOSIS — E83.110 HEREDITARY HEMOCHROMATOSIS (HCC): ICD-10-CM

## 2024-08-27 DIAGNOSIS — Z87.891 HISTORY OF NICOTINE DEPENDENCE: ICD-10-CM

## 2024-08-27 DIAGNOSIS — G47.33 OBSTRUCTIVE SLEEP APNEA: ICD-10-CM

## 2024-08-27 DIAGNOSIS — I73.81 ERYTHROMELALGIA (HCC): ICD-10-CM

## 2024-08-27 DIAGNOSIS — D75.1 ERYTHROCYTOSIS DUE TO ALVEOLAR HYPOVENTILATION: Primary | ICD-10-CM

## 2024-08-27 DIAGNOSIS — R06.89 ERYTHROCYTOSIS DUE TO ALVEOLAR HYPOVENTILATION: Primary | ICD-10-CM

## 2024-08-27 LAB
BASOPHILS # BLD: 0.11 K/UL (ref 0.01–0.08)
BASOPHILS NFR BLD: 1.4 % (ref 0.1–1.2)
EOSINOPHIL # BLD: 0.11 K/UL (ref 0.04–0.54)
EOSINOPHIL NFR BLD: 1.4 % (ref 0.7–7)
ERYTHROCYTE [DISTWIDTH] IN BLOOD BY AUTOMATED COUNT: 11.8 % (ref 11.7–14.4)
FERRITIN SERPL-MCNC: 25.6 NG/ML (ref 13–150)
HCT VFR BLD AUTO: 43.4 % (ref 34.1–44.9)
HGB BLD-MCNC: 14.4 G/DL (ref 11.2–15.7)
IRON SATN MFR SERPL: 24 % (ref 14–50)
IRON SERPL-MCNC: 92 UG/DL (ref 37–145)
LYMPHOCYTES # BLD: 2.81 K/UL (ref 1.18–3.74)
LYMPHOCYTES NFR BLD: 35.6 % (ref 19.3–53.1)
MCH RBC QN AUTO: 29 PG (ref 25.6–32.2)
MCHC RBC AUTO-ENTMCNC: 33.2 G/DL (ref 32.3–35.5)
MCV RBC AUTO: 87.5 FL (ref 79.4–94.8)
MONOCYTES # BLD: 0.66 K/UL (ref 0.24–0.82)
MONOCYTES NFR BLD: 8.4 % (ref 4.7–12.5)
NEUTROPHILS # BLD: 4.19 K/UL (ref 1.56–6.13)
NEUTS SEG NFR BLD: 53.1 % (ref 34–71.1)
PLATELET # BLD AUTO: 269 K/UL (ref 182–369)
PMV BLD AUTO: 10.1 FL (ref 7.4–10.4)
RBC # BLD AUTO: 4.96 M/UL (ref 3.93–5.22)
TIBC SERPL-MCNC: 381 UG/DL (ref 250–400)
WBC # BLD AUTO: 7.89 K/UL (ref 3.98–10.04)

## 2024-08-27 PROCEDURE — 85025 COMPLETE CBC W/AUTO DIFF WBC: CPT

## 2024-08-27 PROCEDURE — 36415 COLL VENOUS BLD VENIPUNCTURE: CPT

## 2024-08-27 PROCEDURE — 99212 OFFICE O/P EST SF 10 MIN: CPT

## 2024-08-27 PROCEDURE — 99214 OFFICE O/P EST MOD 30 MIN: CPT | Performed by: PHYSICIAN ASSISTANT

## 2024-08-27 PROCEDURE — G2211 COMPLEX E/M VISIT ADD ON: HCPCS | Performed by: PHYSICIAN ASSISTANT

## 2024-08-27 PROCEDURE — 99195 PHLEBOTOMY: CPT

## 2024-08-27 RX ORDER — 0.9 % SODIUM CHLORIDE 0.9 %
500 INTRAVENOUS SOLUTION INTRAVENOUS ONCE
OUTPATIENT
Start: 2024-08-30 | End: 2024-08-30

## 2024-08-27 ASSESSMENT — ENCOUNTER SYMPTOMS
VOICE CHANGE: 1
EYE ITCHING: 0
DIARRHEA: 0
BACK PAIN: 1
EYE DISCHARGE: 0
VOMITING: 0
CONSTIPATION: 0
TROUBLE SWALLOWING: 0
COLOR CHANGE: 0
BLOOD IN STOOL: 0
WHEEZING: 0
SHORTNESS OF BREATH: 0
NAUSEA: 0
ABDOMINAL DISTENTION: 0
SORE THROAT: 0
PHOTOPHOBIA: 0
COUGH: 0
ABDOMINAL PAIN: 0

## 2024-08-27 NOTE — PROGRESS NOTES
THERAPEUTIC PHLEBOTOMY    Most recent Hemoglobin 14.4Gm/dl and Hematocrit 43.4%    Date obtained: 08 /27 /2024      Pre-phlebotomy Vital Signs: Refer to Doc flow sheet    Start time: 155    Tourniquet placed on patient's arm and arm palpated for venous access. Area of venous access cleansed with alcohol. Sheath removed from the needle and needle inserted into the right antecubital site. Blood flowing well down the tubing into collection bag. Adjustment/no adjustment of needle needed to maintain adequate blood flow. Scale monitoring amount of blood in bag.    Stop Time: 210  Needle removed from patient's arm.  Pressure applied to patient's arm until bleeding stopped. Dry sterile dressing applied over puncture site and secured with Coban/self-adherent wrap.    Final amount of blood removed: 500 ml  Post Vital Signs: Refer to Doc flow sheet      Patient tolerated procedure well. No redness, edema, or signs of active bleeding noted.       Discharge Instructions provided: No heavy pushing or lifting for the next 24 hours. Keep dressing dry and in place for 4 to 6 hours. If a fever GREATER than 100.5 develops, contact office.    Patient discharged ambulatory with belongings.

## 2024-09-26 ENCOUNTER — TELEPHONE (OUTPATIENT)
Dept: HEMATOLOGY | Age: 59
End: 2024-09-26

## 2024-09-30 NOTE — PROGRESS NOTES
Patient name: Martha Garcia  Patient : 1965    Lab Results   Component Value Date    WBC 4.72 10/01/2024    HGB 14.4 10/01/2024    HCT 44.6 10/01/2024    MCV 87.5 10/01/2024     10/01/2024     Lab Results   Component Value Date    NEUTROABS 1.94 10/01/2024         Procedure Note: Bone Marrow Aspirate and Biopsy    Indication:    1. Erythrocytosis    2. Erythromelalgia (HCC)        Informed consent was signed by Martha Garcia.  she  was placed in the left lateral decubitus position.  The patient was prepped and draped in sterile fashion.  Lidocaine 1% was used for local anesthetic of the skin and periosteum.  A bone marrow aspirate and biopsy was obtained from the right PIC and sent for surgical pathology review,  flow cytometry, and chromosome analysis.  The total blood loss was less than 3 mL. The skin was cleaned and a sterile bandage was placed on the entrance site.  Martha Garcia tolerated the procedure without complication.     John Escobar PA-C    24  7:39 AM

## 2024-10-01 ENCOUNTER — HOSPITAL ENCOUNTER (OUTPATIENT)
Dept: INFUSION THERAPY | Age: 59
Discharge: HOME | End: 2024-10-01
Payer: COMMERCIAL

## 2024-10-01 ENCOUNTER — OFFICE VISIT (OUTPATIENT)
Dept: HEMATOLOGY | Age: 59
End: 2024-10-01
Payer: COMMERCIAL

## 2024-10-01 VITALS
RESPIRATION RATE: 18 BRPM | BODY MASS INDEX: 28.13 KG/M2 | SYSTOLIC BLOOD PRESSURE: 150 MMHG | HEART RATE: 77 BPM | WEIGHT: 164.8 LBS | HEIGHT: 64 IN | DIASTOLIC BLOOD PRESSURE: 82 MMHG | OXYGEN SATURATION: 99 %

## 2024-10-01 DIAGNOSIS — D75.1 ERYTHROCYTOSIS: Primary | ICD-10-CM

## 2024-10-01 DIAGNOSIS — E83.118 OTHER HEMOCHROMATOSIS: Primary | ICD-10-CM

## 2024-10-01 DIAGNOSIS — E83.110 HEREDITARY HEMOCHROMATOSIS (HCC): ICD-10-CM

## 2024-10-01 DIAGNOSIS — I73.81 ERYTHROMELALGIA (HCC): ICD-10-CM

## 2024-10-01 DIAGNOSIS — D75.1 ERYTHROCYTOSIS: ICD-10-CM

## 2024-10-01 LAB
BASOPHILS # BLD: 0.08 K/UL (ref 0.01–0.08)
BASOPHILS NFR BLD: 1.7 % (ref 0.1–1.2)
EOSINOPHIL # BLD: 0.09 K/UL (ref 0.04–0.54)
EOSINOPHIL NFR BLD: 1.9 % (ref 0.7–7)
ERYTHROCYTE [DISTWIDTH] IN BLOOD BY AUTOMATED COUNT: 12.4 % (ref 11.7–14.4)
ERYTHROCYTE [SEDIMENTATION RATE] IN BLOOD BY WESTERGREN METHOD: 2 MM/HR (ref 0–25)
HCT VFR BLD AUTO: 44.6 % (ref 34.1–44.9)
HGB BLD-MCNC: 14.4 G/DL (ref 11.2–15.7)
LYMPHOCYTES # BLD: 2.16 K/UL (ref 1.18–3.74)
LYMPHOCYTES NFR BLD: 45.8 % (ref 19.3–53.1)
MCH RBC QN AUTO: 28.2 PG (ref 25.6–32.2)
MCHC RBC AUTO-ENTMCNC: 32.3 G/DL (ref 32.3–35.5)
MCV RBC AUTO: 87.5 FL (ref 79.4–94.8)
MONOCYTES # BLD: 0.44 K/UL (ref 0.24–0.82)
MONOCYTES NFR BLD: 9.3 % (ref 4.7–12.5)
NEUTROPHILS # BLD: 1.94 K/UL (ref 1.56–6.13)
NEUTS SEG NFR BLD: 41.1 % (ref 34–71.1)
PLATELET # BLD AUTO: 289 K/UL (ref 182–369)
PMV BLD AUTO: 9.9 FL (ref 7.4–10.4)
RBC # BLD AUTO: 5.1 M/UL (ref 3.93–5.22)
WBC # BLD AUTO: 4.72 K/UL (ref 3.98–10.04)

## 2024-10-01 PROCEDURE — 99195 PHLEBOTOMY: CPT

## 2024-10-01 PROCEDURE — 85025 COMPLETE CBC W/AUTO DIFF WBC: CPT

## 2024-10-01 PROCEDURE — 36415 COLL VENOUS BLD VENIPUNCTURE: CPT

## 2024-10-01 PROCEDURE — 38222 DX BONE MARROW BX & ASPIR: CPT | Performed by: PHYSICIAN ASSISTANT

## 2024-10-01 RX ORDER — 0.9 % SODIUM CHLORIDE 0.9 %
500 INTRAVENOUS SOLUTION INTRAVENOUS ONCE
OUTPATIENT
Start: 2024-10-06 | End: 2024-10-06

## 2024-10-01 NOTE — PROGRESS NOTES
THERAPEUTIC PHLEBOTOMY    Most recent Hemoglobin 14.4 Gm/dl and Hematocrit 44.6%    Date obtained: 10 /01 /2024    Pre-phlebotomy Vital Signs: Refer to Doc flow sheet    Start time: 1135    Tourniquet placed on patient's arm and arm palpated for venous access. Area of venous access cleansed with alcohol. Sheath removed from the needle and needle inserted into the right antecubital site. Blood flowing well down the tubing into collection bag. Adjustment/no adjustment of needle needed to maintain adequate blood flow. Scale monitoring amount of blood in bag.    Stop Time: 1145  Needle removed from patient's arm.  Pressure applied to patient's arm until bleeding stopped. Dry sterile dressing applied over puncture site and secured with Coban/self-adherent wrap.    Final amount of blood removed: 100 ml  Post Vital Signs: Refer to Doc flow sheet      Patient tolerated procedure well. No redness, edema, or signs of active bleeding noted.       Discharge Instructions provided: No heavy pushing or lifting for the next 24 hours. Keep dressing dry and in place for 4 to 6 hours. If a fever GREATER than 100.5 develops, contact office.    Patient discharged ambulatory with belongings.

## 2024-10-02 LAB
FERRITIN SERPL-MCNC: 32 NG/ML (ref 13–150)
IRON SATN MFR SERPL: 37 % (ref 14–50)
IRON SERPL-MCNC: 132 UG/DL (ref 37–145)
TIBC SERPL-MCNC: 361 UG/DL (ref 250–400)

## 2024-10-06 LAB
ALBUMIN SERPL-MCNC: 4.44 G/DL (ref 3.75–5.01)
ALPHA1 GLOB SERPL ELPH-MCNC: 0.17 G/DL (ref 0.19–0.46)
ALPHA2 GLOB SERPL ELPH-MCNC: 0.66 G/DL (ref 0.48–1.05)
B-GLOBULIN SERPL ELPH-MCNC: 0.75 G/DL (ref 0.48–1.1)
DEPRECATED KAPPA LC FREE/LAMBDA SER: 1.32 {RATIO} (ref 0.26–1.65)
EER MONOCLONAL PROTEIN AND FLC, SERUM: ABNORMAL
GAMMA GLOB SERPL ELPH-MCNC: 0.59 G/DL (ref 0.62–1.51)
IGA SERPL-MCNC: 164 MG/DL (ref 68–408)
IGG SERPL-MCNC: 526 MG/DL (ref 768–1632)
IGM SERPL-MCNC: 108 MG/DL (ref 35–263)
INTERPRETATION SERPL IFE-IMP: ABNORMAL
INTERPRETATION SERPL IFE-IMP: ABNORMAL
KAPPA LC FREE SER-MCNC: 13.91 MG/L (ref 3.3–19.4)
LAMBDA LC FREE SERPL-MCNC: 10.51 MG/L (ref 5.71–26.3)
MONOCLONAL PROTEIN, SERUM: ABNORMAL G/DL
PROT SERPL-MCNC: 6.6 G/DL (ref 6.3–8.2)

## 2024-10-31 ENCOUNTER — TELEPHONE (OUTPATIENT)
Dept: HEMATOLOGY | Age: 59
End: 2024-10-31

## 2024-10-31 DIAGNOSIS — E83.110 HEREDITARY HEMOCHROMATOSIS (HCC): ICD-10-CM

## 2024-10-31 DIAGNOSIS — D75.1 ERYTHROCYTOSIS: Primary | ICD-10-CM

## 2024-10-31 NOTE — PROGRESS NOTES
Progress Note      Pt Name: Martha Garcia  YOB: 1965  MRN: [id number]    Date of evaluation: 11/4/2024  History Obtained From:  patient, electronic medical record    CHIEF COMPLAINT:    Chief Complaint   Patient presents with    Follow-up     Erythrocytosis     Current active problems  Erythrocytosis  Heterozygous C282Y hereditary hemochromatosis   Sleep apnea    HISTORY OF PRESENT ILLNESS:    Martha Garcia is a 59 y.o.  female who was initially seen in the office on 8/22/2023 for mild erythrocytosis.  She has a apnea but does not use her machine it is still in [place] where she had a divorce.      Due to her fatigue, hand tightness pain, and elevated ferritin hereditary hemochromatosis panel was obtained and was positive-heterozygous C282Y.  Fatigue is actually improved with therapeutic phlebotomies.  However bone marrow was performed to evaluate her for a potential JAK2 negative MPN.  She is here to review the results.  She reports that her hands do not feel tight today.    She does have essential hypertension and is on lisinopril 10 daily.  She does take fish oil 200 mg daily.  She takes vitamin D daily.  She takes hormone replacement that does have estrogen and methyltestosterone.    1st HEMATOLOGY HISTORY: Mild erythrocytosis  Martha was seen in initial hematology consultation on 8/22/2023 referred from Dr. Aime Carmichael for evaluation of elevated hemoglobin.      Serology 7/14/2023  CBC- HGB 16.2, HCT 48.3, MCV 92     Martha denied any nicotine dependence, COPD etc.  However she does have sleep apnea and had difficulty tolerating her CPAP therefore she does not use it.     She does not have any overt headaches at this time, chest pain, visual disturbances.  She reports that for several years her hemoglobin has been elevated at times.  She denies any family history of blood dyscrasias.     Her initial CBC in the office on 8/22/2023 revealed a WBC of 5.3 with 40.4% PMN, 43.8% lymphocyte,

## 2024-11-04 ENCOUNTER — HOSPITAL ENCOUNTER (OUTPATIENT)
Dept: INFUSION THERAPY | Age: 59
Discharge: HOME | End: 2024-11-04
Payer: COMMERCIAL

## 2024-11-04 ENCOUNTER — OFFICE VISIT (OUTPATIENT)
Dept: HEMATOLOGY | Age: 59
End: 2024-11-04
Payer: COMMERCIAL

## 2024-11-04 VITALS
WEIGHT: 166.1 LBS | HEIGHT: 64 IN | HEART RATE: 61 BPM | TEMPERATURE: 98.6 F | BODY MASS INDEX: 28.36 KG/M2 | SYSTOLIC BLOOD PRESSURE: 122 MMHG | OXYGEN SATURATION: 97 % | DIASTOLIC BLOOD PRESSURE: 76 MMHG

## 2024-11-04 DIAGNOSIS — D75.1 ERYTHROCYTOSIS: ICD-10-CM

## 2024-11-04 DIAGNOSIS — E83.110 HEREDITARY HEMOCHROMATOSIS (HCC): ICD-10-CM

## 2024-11-04 DIAGNOSIS — D75.1 ERYTHROCYTOSIS: Primary | ICD-10-CM

## 2024-11-04 DIAGNOSIS — I73.81 ERYTHROMELALGIA (HCC): ICD-10-CM

## 2024-11-04 DIAGNOSIS — Z87.891 HISTORY OF NICOTINE DEPENDENCE: ICD-10-CM

## 2024-11-04 DIAGNOSIS — G47.33 OBSTRUCTIVE SLEEP APNEA: ICD-10-CM

## 2024-11-04 LAB
BASOPHILS # BLD: 0.11 K/UL (ref 0.01–0.08)
BASOPHILS NFR BLD: 1.6 % (ref 0.1–1.2)
EOSINOPHIL # BLD: 0.1 K/UL (ref 0.04–0.54)
EOSINOPHIL NFR BLD: 1.5 % (ref 0.7–7)
ERYTHROCYTE [DISTWIDTH] IN BLOOD BY AUTOMATED COUNT: 13.3 % (ref 11.7–14.4)
FERRITIN SERPL-MCNC: 25.5 NG/ML (ref 13–150)
HCT VFR BLD AUTO: 43.9 % (ref 34.1–44.9)
HGB BLD-MCNC: 14.3 G/DL (ref 11.2–15.7)
LYMPHOCYTES # BLD: 2.65 K/UL (ref 1.18–3.74)
LYMPHOCYTES NFR BLD: 39 % (ref 19.3–53.1)
MCH RBC QN AUTO: 27.9 PG (ref 25.6–32.2)
MCHC RBC AUTO-ENTMCNC: 32.6 G/DL (ref 32.3–35.5)
MCV RBC AUTO: 85.6 FL (ref 79.4–94.8)
MONOCYTES # BLD: 0.62 K/UL (ref 0.24–0.82)
MONOCYTES NFR BLD: 9.1 % (ref 4.7–12.5)
NEUTROPHILS # BLD: 3.3 K/UL (ref 1.56–6.13)
NEUTS SEG NFR BLD: 48.7 % (ref 34–71.1)
PLATELET # BLD AUTO: 302 K/UL (ref 182–369)
PMV BLD AUTO: 9.8 FL (ref 7.4–10.4)
RBC # BLD AUTO: 5.13 M/UL (ref 3.93–5.22)
WBC # BLD AUTO: 6.79 K/UL (ref 3.98–10.04)

## 2024-11-04 PROCEDURE — 99212 OFFICE O/P EST SF 10 MIN: CPT

## 2024-11-04 PROCEDURE — 99213 OFFICE O/P EST LOW 20 MIN: CPT | Performed by: PHYSICIAN ASSISTANT

## 2024-11-04 PROCEDURE — 36415 COLL VENOUS BLD VENIPUNCTURE: CPT

## 2024-11-04 PROCEDURE — 85025 COMPLETE CBC W/AUTO DIFF WBC: CPT

## 2024-11-04 PROCEDURE — G2211 COMPLEX E/M VISIT ADD ON: HCPCS | Performed by: PHYSICIAN ASSISTANT

## 2024-11-04 ASSESSMENT — ENCOUNTER SYMPTOMS
BLOOD IN STOOL: 0
ABDOMINAL DISTENTION: 0
VOMITING: 0
TROUBLE SWALLOWING: 0
VOICE CHANGE: 0
EYE DISCHARGE: 0
CONSTIPATION: 0
PHOTOPHOBIA: 0
SORE THROAT: 0
COUGH: 0
SHORTNESS OF BREATH: 0
DIARRHEA: 0
COLOR CHANGE: 0
EYE ITCHING: 0
NAUSEA: 0
WHEEZING: 0
BACK PAIN: 1
ABDOMINAL PAIN: 0

## 2025-01-07 DIAGNOSIS — E83.118 OTHER HEMOCHROMATOSIS: ICD-10-CM

## 2025-01-07 LAB
BASOPHILS # BLD: 0.1 K/UL (ref 0–0.2)
BASOPHILS NFR BLD: 1 % (ref 0–1)
EOSINOPHIL # BLD: 0.2 K/UL (ref 0–0.6)
EOSINOPHIL NFR BLD: 2.2 % (ref 0–5)
ERYTHROCYTE [DISTWIDTH] IN BLOOD BY AUTOMATED COUNT: 13.6 % (ref 11.5–14.5)
FERRITIN SERPL-MCNC: 21.5 NG/ML (ref 13–150)
HCT VFR BLD AUTO: 42.7 % (ref 37–47)
HGB BLD-MCNC: 13.3 G/DL (ref 12–16)
IMM GRANULOCYTES # BLD: 0 K/UL
LYMPHOCYTES # BLD: 3.9 K/UL (ref 1.1–4.5)
LYMPHOCYTES NFR BLD: 37.3 % (ref 20–40)
MCH RBC QN AUTO: 27 PG (ref 27–31)
MCHC RBC AUTO-ENTMCNC: 31.1 G/DL (ref 33–37)
MCV RBC AUTO: 86.6 FL (ref 81–99)
MONOCYTES # BLD: 0.8 K/UL (ref 0–0.9)
MONOCYTES NFR BLD: 8.1 % (ref 0–10)
NEUTROPHILS # BLD: 5.3 K/UL (ref 1.5–7.5)
NEUTS SEG NFR BLD: 51.1 % (ref 50–65)
PLATELET # BLD AUTO: 341 K/UL (ref 130–400)
PMV BLD AUTO: 10.3 FL (ref 9.4–12.3)
RBC # BLD AUTO: 4.93 M/UL (ref 4.2–5.4)
WBC # BLD AUTO: 10.4 K/UL (ref 4.8–10.8)

## 2025-02-27 ENCOUNTER — TELEPHONE (OUTPATIENT)
Dept: HEMATOLOGY | Age: 60
End: 2025-02-27

## 2025-02-27 NOTE — TELEPHONE ENCOUNTER
Called pt. to remind them of appointment on 03/04/2025 and had to leave a detailed voicemail with appointment date and time. Reminded patient to just come at appointment time, and to not come at the lab appointment time. Reminded patient that we will not check them in any more than 30 minutes before appointment time. We have now moved to the [facility] that is located between our old office and the ER at the [facility] [place]. Letting the Pt know that our front entrance faces the [employer] fields and leaving our address. Reminded pt to eat well and be well hydrated for their labs.

## 2025-03-20 ENCOUNTER — TELEPHONE (OUTPATIENT)
Dept: HEMATOLOGY | Age: 60
End: 2025-03-20

## 2025-03-20 NOTE — TELEPHONE ENCOUNTER
Called Patient and reminded patient of their appointment on 03/25/2025 and patient confirmed they would be here. Reminded patient to just come at appointment time, and to not come at the lab appointment time. Reminded patient that we will not check them in any more than 30 minutes before appointment time.  We have now moved to the [facility] that is located between our old office and the ER at the [facility]. Letting the Pt know that our front entrance faces the  [employer] [place]. Reminded pt to eat well and be well hydrated for their labs.

## 2025-03-24 DIAGNOSIS — R79.89 ELEVATED FERRITIN: ICD-10-CM

## 2025-03-24 DIAGNOSIS — E83.110 HEREDITARY HEMOCHROMATOSIS: Primary | ICD-10-CM

## 2025-03-24 DIAGNOSIS — D75.1 ERYTHROCYTOSIS: ICD-10-CM

## 2025-03-24 NOTE — PROGRESS NOTES
pain at a level of 8. The surgery was supposed to have been done 5 years ago.    Diagnosis of Sjogren's syndrome has been made, manifesting as dry eyes and dry mouth. Punch biopsies have been performed, and the results are pending.    She does have essential hypertension and is on lisinopril 10 daily.  She does take fish oil 200 mg daily.  She takes vitamin D daily.  She takes hormone replacement that does have estrogen and methyltestosterone.    1st HEMATOLOGY HISTORY: Mild erythrocytosis  Martha was seen in initial hematology consultation on 8/22/2023 referred from Dr. Aime Carmichael for evaluation of elevated hemoglobin.      Serology 7/14/2023  CBC- HGB 16.2, HCT 48.3, MCV 92     Martha denied any nicotine dependence, COPD etc.  However she does have sleep apnea and had difficulty tolerating her CPAP therefore she does not use it.     She does not have any overt headaches at this time, chest pain, visual disturbances.  She reports that for several years her hemoglobin has been elevated at times.  She denies any family history of blood dyscrasias.     Her initial CBC in the office on 8/22/2023 revealed a WBC of 5.3 with 40.4% PMN, 43.8% lymphocyte, 11.9% monocyte, Hgb 15.2, HCT 43.6, ,000.     Her differential percentages were within normal range.  PLT was considered normal since it is greater than 150,000.     Her HCT at her initial visit was actually normal.  Reviewing her history above reveals that her intermittent erythrocytosis could be related to her untreated sleep apnea.  I encouraged her to get a new machine and work on treatment of her underlying obstructive sleep apnea.  I will check iron panel with ferritin, erythropoietin level.  However, I am holding on JAK2 with reflex to evaluate MPN since she does not have any significant abnormality of her CBC today.  She does not have any splenomegaly on examination.     Her liver edge was palpable, soft minimally tender.  Prior CMP reviewed from her

## 2025-03-25 ENCOUNTER — OFFICE VISIT (OUTPATIENT)
Dept: HEMATOLOGY | Age: 60
End: 2025-03-25
Payer: COMMERCIAL

## 2025-03-25 ENCOUNTER — HOSPITAL ENCOUNTER (OUTPATIENT)
Dept: INFUSION THERAPY | Age: 60
Discharge: HOME | End: 2025-03-25
Payer: COMMERCIAL

## 2025-03-25 VITALS
HEART RATE: 73 BPM | BODY MASS INDEX: 28.03 KG/M2 | TEMPERATURE: 97.9 F | WEIGHT: 164.2 LBS | DIASTOLIC BLOOD PRESSURE: 80 MMHG | OXYGEN SATURATION: 98 % | HEIGHT: 64 IN | SYSTOLIC BLOOD PRESSURE: 124 MMHG

## 2025-03-25 DIAGNOSIS — E83.110 HEREDITARY HEMOCHROMATOSIS: ICD-10-CM

## 2025-03-25 DIAGNOSIS — E83.118 OTHER HEMOCHROMATOSIS: Primary | ICD-10-CM

## 2025-03-25 DIAGNOSIS — G47.33 OBSTRUCTIVE SLEEP APNEA: ICD-10-CM

## 2025-03-25 DIAGNOSIS — I73.81 ERYTHROMELALGIA: ICD-10-CM

## 2025-03-25 DIAGNOSIS — D75.1 ERYTHROCYTOSIS: ICD-10-CM

## 2025-03-25 DIAGNOSIS — E83.110 HEREDITARY HEMOCHROMATOSIS: Primary | ICD-10-CM

## 2025-03-25 DIAGNOSIS — R79.89 ELEVATED FERRITIN: ICD-10-CM

## 2025-03-25 DIAGNOSIS — R53.83 OTHER FATIGUE: ICD-10-CM

## 2025-03-25 LAB
BASOPHILS # BLD: 0.04 K/UL (ref 0–0.2)
BASOPHILS NFR BLD: 0.6 % (ref 0–1)
EOSINOPHIL # BLD: 0.15 K/UL (ref 0–0.6)
EOSINOPHIL NFR BLD: 2.4 % (ref 0–5)
ERYTHROCYTE [DISTWIDTH] IN BLOOD BY AUTOMATED COUNT: 14.3 % (ref 11.5–14.5)
FERRITIN SERPL-MCNC: 23.4 NG/ML (ref 13–150)
HCT VFR BLD AUTO: 41.6 % (ref 37–47)
HGB BLD-MCNC: 13.2 G/DL (ref 12–16)
LYMPHOCYTES # BLD: 2.05 K/UL (ref 1.1–4.5)
LYMPHOCYTES NFR BLD: 32.8 % (ref 20–40)
MCH RBC QN AUTO: 26.7 PG (ref 27–31)
MCHC RBC AUTO-ENTMCNC: 31.7 G/DL (ref 33–37)
MCV RBC AUTO: 84.2 FL (ref 81–99)
MONOCYTES # BLD: 0.48 K/UL (ref 0–0.9)
MONOCYTES NFR BLD: 7.7 % (ref 1–10)
NEUTROPHILS # BLD: 3.51 K/UL (ref 1.5–7.5)
NEUTS SEG NFR BLD: 56.2 % (ref 50–65)
PLATELET # BLD AUTO: 278 K/UL (ref 130–400)
PMV BLD AUTO: 9.9 FL (ref 9.4–12.3)
RBC # BLD AUTO: 4.94 M/UL (ref 4.2–5.4)
WBC # BLD AUTO: 6.25 K/UL (ref 4.8–10.8)

## 2025-03-25 PROCEDURE — G2211 COMPLEX E/M VISIT ADD ON: HCPCS | Performed by: PHYSICIAN ASSISTANT

## 2025-03-25 PROCEDURE — 36415 COLL VENOUS BLD VENIPUNCTURE: CPT

## 2025-03-25 PROCEDURE — 99213 OFFICE O/P EST LOW 20 MIN: CPT | Performed by: PHYSICIAN ASSISTANT

## 2025-03-25 PROCEDURE — 99195 PHLEBOTOMY: CPT

## 2025-03-25 PROCEDURE — 82728 ASSAY OF FERRITIN: CPT

## 2025-03-25 PROCEDURE — 85025 COMPLETE CBC W/AUTO DIFF WBC: CPT

## 2025-03-25 PROCEDURE — 99213 OFFICE O/P EST LOW 20 MIN: CPT

## 2025-03-25 RX ORDER — 0.9 % SODIUM CHLORIDE 0.9 %
500 INTRAVENOUS SOLUTION INTRAVENOUS ONCE
OUTPATIENT
Start: 2025-03-30 | End: 2025-03-30

## 2025-03-25 ASSESSMENT — ENCOUNTER SYMPTOMS
EYE DISCHARGE: 0
VOICE CHANGE: 0
CONSTIPATION: 0
BLOOD IN STOOL: 0
COLOR CHANGE: 0
TROUBLE SWALLOWING: 0
NAUSEA: 0
BACK PAIN: 1
VOMITING: 0
COUGH: 0
WHEEZING: 0
EYE ITCHING: 0
SORE THROAT: 0
DIARRHEA: 0
ABDOMINAL DISTENTION: 0
SHORTNESS OF BREATH: 0
ABDOMINAL PAIN: 0
PHOTOPHOBIA: 0

## 2025-03-25 NOTE — PROGRESS NOTES
THERAPEUTIC PHLEBOTOMY    Most recent Hemoglobin 13.2Gm/dl and Hematocrit 41.6%    Date obtained: 03 /25 /2025      Pre-phlebotomy Vital Signs: Refer to Doc flow sheet    Start time: 1145    Tourniquet placed on patient's arm and arm palpated for venous access. Area of venous access cleansed with alcohol. Sheath removed from the needle and needle inserted into the right antecubital site. Blood flowing well down the tubing into collection bag. Adjustment/no adjustment of needle needed to maintain adequate blood flow. Scale monitoring amount of blood in bag.    Stop Time: 1200  Needle removed from patient's arm.  Pressure applied to patient's arm until bleeding stopped. Dry sterile dressing applied over puncture site and secured with Coban/self-adherent wrap.    Final amount of blood removed: 250 ml  Post Vital Signs: Refer to Doc flow sheet      Patient tolerated procedure well. No redness, edema, or signs of active bleeding noted.       Discharge Instructions provided: No heavy pushing or lifting for the next 24 hours. Keep dressing dry and in place for 4 to 6 hours. If a fever GREATER than 100.5 develops, contact office.    Patient discharged ambulatory with belongings.

## 2025-06-02 ENCOUNTER — HOSPITAL ENCOUNTER (OUTPATIENT)
Dept: NON INVASIVE DIAGNOSTICS | Age: 60
Discharge: HOME | End: 2025-06-02
Payer: COMMERCIAL

## 2025-06-02 PROCEDURE — 93005 ELECTROCARDIOGRAM TRACING: CPT | Performed by: ORTHOPAEDIC SURGERY

## 2025-06-03 LAB
EKG P AXIS: 45 DEGREES
EKG P-R INTERVAL: 172 MS
EKG Q-T INTERVAL: 388 MS
EKG QRS DURATION: 74 MS
EKG QTC CALCULATION (BAZETT): 389 MS
EKG T AXIS: 29 DEGREES

## 2025-06-03 PROCEDURE — 93010 ELECTROCARDIOGRAM REPORT: CPT | Performed by: INTERNAL MEDICINE
